# Patient Record
Sex: FEMALE | Race: WHITE | NOT HISPANIC OR LATINO | Employment: FULL TIME | URBAN - METROPOLITAN AREA
[De-identification: names, ages, dates, MRNs, and addresses within clinical notes are randomized per-mention and may not be internally consistent; named-entity substitution may affect disease eponyms.]

---

## 2018-04-23 ENCOUNTER — TELEPHONE (OUTPATIENT)
Dept: TRANSPLANT | Facility: CLINIC | Age: 33
End: 2018-04-23

## 2018-04-23 NOTE — TELEPHONE ENCOUNTER
----- Message from Roosevelt Henry sent at 4/23/2018 12:46 PM CDT -----  We have the pt recorders and they are now pending review by the referral nurse.  By:Roosevelt Henry

## 2018-04-24 ENCOUNTER — DOCUMENTATION ONLY (OUTPATIENT)
Dept: TRANSPLANT | Facility: CLINIC | Age: 33
End: 2018-04-24

## 2018-04-24 NOTE — LETTER
April 24, 2018    Jovanna GILLIS [address]  [facility] [address]      Dear Jovanna Love:    Your doctor has referred you to the [facility]. You will be contacted by our office and an initial appointment will then be scheduled for you.    We look forward to seeing you soon. If you have any further questions, please contact us at [phone].       Sincerely,        [facility]   [address] [place] [address]  [phone]

## 2018-04-24 NOTE — NURSING
Pt records reviewed.  Pt will be referred to Hepatitis C Clinic due to HEP C   Initial referral received from Ivis Peraza NP   Referral letter sent to provider and patient.

## 2018-04-24 NOTE — LETTER
April 24, 2018    Ivis Peraza, NP  [address] [address]      Dear Dr. Peraza    Patient: Jovanna Love   MR Number: [id number]   YOB: 1985     Thank you for the referral of Jovanna Love to the [facility]. An initial appointment will be scheduled for your patient with one of our Hepatologists.      Thank you again for your trust in our program.  If there is anything we can do for you or your staff, please feel free to contact us.        Sincerely,        [facility]  [address] [place] [address]  [phone]

## 2018-05-17 ENCOUNTER — INITIAL CONSULT (OUTPATIENT)
Dept: HEPATOLOGY | Facility: CLINIC | Age: 33
End: 2018-05-17
Payer: MEDICAID

## 2018-05-17 ENCOUNTER — LAB VISIT (OUTPATIENT)
Dept: LAB | Facility: HOSPITAL | Age: 33
End: 2018-05-17
Payer: MEDICAID

## 2018-05-17 VITALS
BODY MASS INDEX: 23.69 KG/M2 | TEMPERATURE: 98 F | DIASTOLIC BLOOD PRESSURE: 74 MMHG | HEIGHT: 65 IN | SYSTOLIC BLOOD PRESSURE: 119 MMHG | HEART RATE: 101 BPM | OXYGEN SATURATION: 98 % | RESPIRATION RATE: 18 BRPM | WEIGHT: 142.19 LBS

## 2018-05-17 DIAGNOSIS — B18.2 CHRONIC HEPATITIS C WITHOUT HEPATIC COMA: ICD-10-CM

## 2018-05-17 DIAGNOSIS — B18.2 CHRONIC HEPATITIS C WITHOUT HEPATIC COMA: Primary | ICD-10-CM

## 2018-05-17 PROCEDURE — 82247 BILIRUBIN TOTAL: CPT

## 2018-05-17 PROCEDURE — 99214 OFFICE O/P EST MOD 30 MIN: CPT | Mod: PBBFAC | Performed by: PHYSICIAN ASSISTANT

## 2018-05-17 PROCEDURE — 36415 COLL VENOUS BLD VENIPUNCTURE: CPT

## 2018-05-17 PROCEDURE — 99999 PR PBB SHADOW E&M-EST. PATIENT-LVL IV: CPT | Mod: PBBFAC,,, | Performed by: PHYSICIAN ASSISTANT

## 2018-05-17 PROCEDURE — 99203 OFFICE O/P NEW LOW 30 MIN: CPT | Mod: S$PBB,,, | Performed by: PHYSICIAN ASSISTANT

## 2018-05-17 RX ORDER — BUSPIRONE HYDROCHLORIDE 10 MG/1
10 TABLET ORAL
COMMUNITY

## 2018-05-17 RX ORDER — SERTRALINE HYDROCHLORIDE 50 MG/1
50 TABLET, FILM COATED ORAL DAILY
COMMUNITY

## 2018-05-17 RX ORDER — AMITRIPTYLINE HYDROCHLORIDE 50 MG/1
50 TABLET, FILM COATED ORAL NIGHTLY
COMMUNITY

## 2018-05-17 RX ORDER — MEDROXYPROGESTERONE ACETATE 150 MG/ML
1 INJECTION, SUSPENSION INTRAMUSCULAR
COMMUNITY
End: 2022-07-07

## 2018-05-17 NOTE — LETTER
May 17, 2018      Ivis Peraza, NP  [address] [facility] [address]           [facility] - Hepatitis C  [address]  [facility] [address]  Phone: [phone]  Fax: [phone]          Patient: Jovanna Love   MR Number: [id number]   YOB: 1985   Date of Visit: 5/17/2018       Dear Ivis Peraza:    Thank you for referring Jovanna Love to me for evaluation. Attached you will find relevant portions of my assessment and plan of care.    If you have questions, please do not hesitate to call me. I look forward to following Jovanna Love along with you.    Sincerely,    Jennifer B. Scheuermann, PA    Enclosure  CC:  No Recipients    If you would like to receive this communication electronically, please contact [email or web address] or [phone] to request more information on [employer] access.    For providers and/or their staff who would like to refer a patient to [facility], please contact us through our one-stop-shop provider referral line, [facility], at [phone].    If you feel you have received this communication in error or would no longer like to receive these types of communications, please e-mail [email or web address]

## 2018-05-17 NOTE — PROGRESS NOTES
"HEPATOLOGY CLINIC VISIT NOTE - HCV clinic    OUTSIDE REFERRING PROVIDER: Ivis Peraza NP    CHIEF COMPLAINT: Hepatitis C     HISTORY: This is a 32 y.o. White female with chronic hepatitis C, here for further eval / mngmt. Outside records have been received / reviewed.      HCV history:  Originally diagnosed 2-3 yrs ago.  Risks for HCV:  IVDA - first use 10 yrs ago, last use 2 yrs ago    Nasal drug use - first use 16 yrs old    Tattoos - first one placed age 16    No blood transfusions    - Treatment naive  - Genotype 3  - NS5A resistance not known  - HCV RNA 52,300 IU/mL - 2018    Liver staging:  No formal liver staging  No liver imaging  Labs reveal well preserved liver function; no obvious advanced fibrosis    Substance abuse:  - prior nasal and IV drug use, first use age 16.   - incarcerated x 2 yrs - drug related charges; recently got out  - last drug use prior to incarceration - 2 yrs ago   Very motivated to stay clean "I don't want to go to group home again"    Other:  Describes getting all 3 shots for hepatitis vaccine while in group home      Feels well  Interested in HCV treatment  Denies jaundice, dark urine, abdominal distention, hematemesis, melena, slowed mentation.   No abnormal skin rashes. No generalized joint pain.                    Past Medical History:   Diagnosis Date    Chronic hepatitis C        Past Surgical History:   Procedure Laterality Date     SECTION         FAMILY HISTORY: Negative for liver disease    SOCIAL HISTORY:   Not   13 year old daughter  Working at hair salon, answering phones and cleaning up  History   Smoking Status    Current Every Day Smoker    Packs/day: 1.00    Types: Cigarettes   Smokeless Tobacco    Not on file       Alcohol - very rare  Drugs - see above. None x 2 yrs. Prior IV heroin      ROS:   No fever, chills, weight loss, fatigue  No chest pain, palpitations, dyspnea, cough  No abdominal pain, change in bowel pattern, nausea, vomiting, " GERD  No dysuria, hematuria. Some urinary hesitancy.   No skin rashes   No headaches  No lower extremity edema      PHYSICAL EXAM:  Friendly White female, in no acute distress; alert and oriented to person, place and time  VITALS: reviewed  HEENT: Sclerae anicteric.   NECK: Supple  CVS: Regular rate and rhythm. No murmurs  LUNGS: Normal respiratory effort. Clear bilaterally  ABDOMEN: Flat, soft, nontender. No organomegaly or masses. No ascites or hernias. Good bowel sounds.    SKIN: Warm and dry. No jaundice, No obvious rashes.   EXTREMITIES: No lower extremity edema  NEURO/PSYCH: Normal gate. Memory intact. Thought and speech pattern appropriate. Behavior normal. No depression or anxiety noted.      RECENT LABS:  Outside labs:  4/18/18:  HIV neg  HBsAg neg  BUN 15, CR 0.68, , K 4.3, TBILI 0.3, ALKPHOS 86, AST 14, ALT 17  WBC 6.1, HGB 13.3,       RECENT IMAGING:  NONE TO REVIEW    ASSESSMENT  32 y.o. White female with:  1. CHRONIC HEPATITIS C, GENOTYPE 3 - treatment naive  -- no liver staging  -- prior vaccination for HAV & HBV while in penitentiary    2. HISTORY OF DRUG USE  -- clean since 2017        EDUCATION:  The natural history of Hepatitis C, including potential progression to cirrhosis was reviewed. We discussed the increased progression of liver disease secondary to alcohol use; patient was advised to avoid alcohol completely.     Transmission of Hepatitis C was reviewed, including possible sexual transmission. Sexual contacts should be screened. Risk of vertical transmission of Hepatitis C from mother to baby was reviewed. Children should be screened. Patient should avoid sharing personal products such as razors, toothbrushes, etc.     We reviewed that vaccination against Hepatitis A and Hepatitis B is recommended if patient does not already have immunity.    Recommend avoiding raw seafood.  Limit acetaminophen to 2000mg daily.          PLAN:  1. Labs & orders  Orders Placed This Encounter    Procedures    US Abdomen Complete    US Elastography Liver    HCV FibroSURE       2. F/U visit. Goal of antiviral therapy.

## 2018-05-18 LAB
A2 MACROGLOB SERPL-MCNC: 230 MG/DL
ALT SERPL W P-5'-P-CCNC: 24 U/L (ref 7–45)
APO A-I SERPL-MCNC: 130 MG/DL
BILIRUB SERPL-MCNC: 0.3 MG/DL
BIOPREDICTIVE SERIAL NUMBER: ABNORMAL
FIBROSIS STAGE SERPL QL: ABNORMAL
FIBROTEST INTERPRETATION: ABNORMAL
FIBROTEST-ACTITEST COMMENT: ABNORMAL
GGT SERPL-CCNC: 17 U/L
HAPTOGLOB SERPL-MCNC: 227 MG/DL
LIVER FIBR SCORE SERPL CALC.FIBROSURE: 0.08
NECROINFLAMMAT INTERP: ABNORMAL
NECROINFLAMMATORY ACT GRADE SERPL QL: ABNORMAL
NECROINFLAMMATORY ACT SCORE SERPL: 0.08

## 2018-06-21 ENCOUNTER — OFFICE VISIT (OUTPATIENT)
Dept: HEPATOLOGY | Facility: CLINIC | Age: 33
End: 2018-06-21
Payer: MEDICAID

## 2018-06-21 ENCOUNTER — HOSPITAL ENCOUNTER (OUTPATIENT)
Dept: RADIOLOGY | Facility: HOSPITAL | Age: 33
Discharge: HOME | End: 2018-06-21
Attending: PHYSICIAN ASSISTANT
Payer: MEDICAID

## 2018-06-21 ENCOUNTER — TELEPHONE (OUTPATIENT)
Dept: PHARMACY | Facility: CLINIC | Age: 33
End: 2018-06-21

## 2018-06-21 ENCOUNTER — PROCEDURE VISIT (OUTPATIENT)
Dept: HEPATOLOGY | Facility: CLINIC | Age: 33
End: 2018-06-21
Attending: PHYSICIAN ASSISTANT
Payer: MEDICAID

## 2018-06-21 VITALS
HEART RATE: 110 BPM | DIASTOLIC BLOOD PRESSURE: 79 MMHG | BODY MASS INDEX: 23.03 KG/M2 | WEIGHT: 138.25 LBS | OXYGEN SATURATION: 98 % | RESPIRATION RATE: 18 BRPM | TEMPERATURE: 97 F | SYSTOLIC BLOOD PRESSURE: 111 MMHG | HEIGHT: 65 IN

## 2018-06-21 DIAGNOSIS — B18.2 CHRONIC HEPATITIS C WITHOUT HEPATIC COMA: ICD-10-CM

## 2018-06-21 DIAGNOSIS — B18.2 CHRONIC HEPATITIS C WITHOUT HEPATIC COMA: Primary | ICD-10-CM

## 2018-06-21 PROCEDURE — 76700 US EXAM ABDOM COMPLETE: CPT | Mod: 26,,, | Performed by: RADIOLOGY

## 2018-06-21 PROCEDURE — 76700 US EXAM ABDOM COMPLETE: CPT | Mod: 59,TC

## 2018-06-21 PROCEDURE — 99213 OFFICE O/P EST LOW 20 MIN: CPT | Mod: S$PBB,,, | Performed by: PHYSICIAN ASSISTANT

## 2018-06-21 PROCEDURE — 99214 OFFICE O/P EST MOD 30 MIN: CPT | Mod: PBBFAC,25 | Performed by: PHYSICIAN ASSISTANT

## 2018-06-21 PROCEDURE — 91200 LIVER ELASTOGRAPHY: CPT | Mod: PBBFAC | Performed by: PHYSICIAN ASSISTANT

## 2018-06-21 PROCEDURE — 99999 PR PBB SHADOW E&M-EST. PATIENT-LVL IV: CPT | Mod: PBBFAC,,, | Performed by: PHYSICIAN ASSISTANT

## 2018-06-21 PROCEDURE — 91200 LIVER ELASTOGRAPHY: CPT | Mod: 26,S$PBB,, | Performed by: PHYSICIAN ASSISTANT

## 2018-06-21 RX ORDER — GABAPENTIN 100 MG/1
100 CAPSULE ORAL
Refills: 1 | COMMUNITY
Start: 2018-05-17

## 2018-06-21 NOTE — PROGRESS NOTES
"HEPATOLOGY CLINIC VISIT NOTE - HCV clinic    CHIEF COMPLAINT: Hepatitis C     HISTORY: This is a 33 y.o. White female with chronic hepatitis C, here for f/u w/ labs, imaging, liver staging    Feels well  Interested in HCV treatment  Denies jaundice, dark urine, abdominal distention, hematemesis, melena, slowed mentation.   No abnormal skin rashes. No generalized joint pain.     HCV history:  Originally diagnosed 2-3 yrs ago.  Risks for HCV:  IVDA - first use 10 yrs ago, last use 2 yrs ago    Nasal drug use - first use 16 yrs old    Tattoos - first one placed age 16    No blood transfusions    - Treatment naive  - Genotype 3  - NS5A resistance not known  - HCV RNA 52,300 IU/mL - 2018    Liver staging:  FibroScan today 18 - kPa 6.0, F0-1  HCV FibroSURE 18 - A0, F0    Labs reveal well preserved liver function; no obvious advanced fibrosis    Substance abuse:  - prior nasal and IV drug use, first use age 16.   - incarcerated x 2 yrs - drug related charges; recently got out  - last drug use prior to incarceration - 2 yrs ago   Very motivated to stay clean "I don't want to go to detention again"    Other:  Describes getting all 3 shots for hepatitis vaccine while in detention                   Past Medical History:   Diagnosis Date    Anxiety     Chronic hepatitis C     History of drug use     heroin. 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 None x 2 yrs. Prior IV heroin      ROS:   No fever, chills, weight loss, fatigue  No chest pain, palpitations, dyspnea, cough  No abdominal pain, nausea, vomiting, GERD  No skin rashes   No " headaches  No lower extremity edema      PHYSICAL EXAM:  Friendly White female, in no acute distress; alert and oriented to person, place and time  VITALS: reviewed  HEENT: Sclerae anicteric.   NECK: Supple  LUNGS: Normal respiratory effort.  ABDOMEN: Flat, soft, nontender.   SKIN: Warm and dry. No jaundice, No obvious rashes.   EXTREMITIES: No lower extremity edema  NEURO/PSYCH: Normal gate. Memory intact. Thought and speech pattern appropriate. Behavior normal. No depression or anxiety noted.      RECENT LABS:  Outside labs:  4/18/18:  HIV neg  HBsAg neg  BUN 15, CR 0.68, , K 4.3, TBILI 0.3, ALKPHOS 86, AST 14, ALT 17  WBC 6.1, HGB 13.3,       RECENT IMAGING:  Results for orders placed during the hospital encounter of 06/21/18   US Abdomen Complete    Narrative EXAMINATION:  US ABDOMEN COMPLETE    CLINICAL HISTORY:  Chronic viral hepatitis C    TECHNIQUE:  Complete abdominal ultrasound (including pancreas, liver, gallbladder, common bile duct, spleen, aorta, IVC, and kidneys) was performed.    COMPARISON:  None    FINDINGS:  Liver: Enlarged in size, measuring 19.2 cm. Diffusely increased parenchymal echogenicity suggestive for steatosis.  No focal hepatic lesions.    Gallbladder: No calculi, wall thickening, or pericholecystic fluid.  No sonographic De's sign.    Biliary system: The common duct is not dilated, measuring 3 mm.  No intrahepatic ductal dilatation.    Spleen: Normal in size with a homogeneous echotexture, measuring 11.0 x 3.9 cm.    Pancreas: The visualized portions of pancreas appear normal.  Peripancreatic lymph node measuring 1.3 x 0.8 x 2.3 cm.    Right kidney: Normal in size with no hydronephrosis, measuring 12.0 cm.    Left kidney:  Normal in size with no hydronephrosis, measuring 11.2 cm.    Aorta: No aneurysm.    Inferior vena cava: Normal in appearance.    Miscellaneous: No ascites.      Impression Hepatomegaly with hepatic steatosis.    Electronically signed by resident:  Angel Daisy  Date:    06/21/2018  Time:    09:14    Electronically signed by: Caden Ulloa MD  Date:    06/21/2018  Time:    09:18           ASSESSMENT  33 y.o. White female with:  1. CHRONIC HEPATITIS C, GENOTYPE 3 - treatment naive  -- F0-1 on both FibroScan and FibroSURE  -- prior vaccination for HAV & HBV while in intermediate; need to document immune status    2. HISTORY OF DRUG USE  -- clean since 2017        EDUCATION:  Discussed goal of HCV eradication to prevent progression of liver disease.  Discussed use of Mavyret (3 pills daily with food) x 8 weeks with potential side effects of fatigue, headache, nausea, diarrhea.     Discussed potential for drug interactions with Mavyret.  Current med list reviewed - no interactions noted. Pt notified that current birth control is okay but must notify me if changes will be made due to interaction b/w ethinyl estradiol and mavyret  Pt to contact me if new medicines are prescribed.  Herbal / alternative therapies must be avoided.    Discussed importance of medication adherence and risk of treatment failure / viral resistance if not adherent. Pt has verbalized understanding.        *Pt is of child bearing potential; high priority for HCV treatment    PLAN:  1. Obtain authorization to treat HCV with mavyret daily x 8  weeks  -- Rx will be routed to [facility]  -- Patient will notify me of exact treatment start date so appropriate lab f/u can be scheduled.    2. Labs today  Orders Placed This Encounter   Procedures    Protime-INR    Hepatitis C genotype    CBC auto differential    Comprehensive metabolic panel    HIV-1 and HIV-2 antibodies    Hepatitis B surface antigen    Hepatitis B surface antibody    Hepatitis B core antibody, total    Hepatitis A antibody, IgG           Cc: Ivis Peraza NP

## 2018-06-21 NOTE — PROCEDURES
Procedures     Fibroscan Procedure     Name: Jovanna Love  Date of Procedure : 2018   :: Jennifer B Scheuermann, PA  Diagnosis: HCV    Probe: M    Fibroscan readin.0 KPa    Fibrosis:F 0-1     CAP readin dB/m    Steatosis: :S1

## 2018-06-28 ENCOUNTER — TELEPHONE (OUTPATIENT)
Dept: HEPATOLOGY | Facility: CLINIC | Age: 33
End: 2018-06-28

## 2018-06-28 DIAGNOSIS — R74.8 ABNORMAL TRANSAMINASES: ICD-10-CM

## 2018-06-28 DIAGNOSIS — Z23 VACCINE FOR VIRAL HEPATITIS: ICD-10-CM

## 2018-06-28 DIAGNOSIS — B18.2 CHRONIC HEPATITIS C WITHOUT HEPATIC COMA: Primary | ICD-10-CM

## 2018-06-28 NOTE — TELEPHONE ENCOUNTER
6/21 labs reviewed  pls call pt  1. HIV screen neg  2. Liver labs all stable. Liver enzymes are elevated from HCV. We will proceed w/ treatment as we discussed.  3. She has protection against Hep A; this vaccine not needed  4. Labs show no protection against Hep B even though she recalls getting vaccine in past. I am sending Rx for this to [facility] to see if it is covered there. They will be in touch.  5. Call me when you have your HCV meds    thanks

## 2018-06-29 ENCOUNTER — TELEPHONE (OUTPATIENT)
Dept: HEPATOLOGY | Facility: CLINIC | Age: 33
End: 2018-06-29

## 2018-06-29 NOTE — TELEPHONE ENCOUNTER
----- Message from Jennifer B. Scheuermann, PA sent at 6/28/2018  6:27 PM CDT -----  Regarding: FW: hep b  pls contact pt.  HBV vaccine is not covered by her insurance in the pharmacy.  She will need to either ask her PCP to give it to her.  OR I can set it up at main campus.  Thanks    ----- Message -----  From: Lea Saunders, PharmD  Sent: 6/28/2018   8:06 AM  To: Jennifer B. Scheuermann, PA  Subject: hep b                                            Good morning! Mrs. Love  Immunization is not covered by her pharmacy benefits if you would like to contact the patient. Thank you!

## 2018-06-29 NOTE — TELEPHONE ENCOUNTER
Attempt made to reach patient.  Unable to LVM.  Message from provider mailed to her.  I included an order for her to bring to PCP to have Hep B vaccines series done.  I asked asked that the contact us here if PCP not able to provide and she wanted series scheduled here in [place].

## 2018-08-20 NOTE — TELEPHONE ENCOUNTER
Mavalexeyet denial upheld by patients insurance. We will be assisting the patient in applying to the . Sending application prescription to Jennifer Scheuermann PA for her review and signature.

## 2018-08-29 ENCOUNTER — EPISODE CHANGES (OUTPATIENT)
Dept: HEPATOLOGY | Facility: CLINIC | Age: 33
End: 2018-08-29

## 2018-09-11 NOTE — TELEPHONE ENCOUNTER
FOR DOCUMENTATION ONLY:  Financial Assistance for Mavyret is approved from 9-10-18 to 3-9-19  Source [employer] Patient Assistance  Sending a staff message to Jennifer Scheuermann PA regarding approval.

## 2018-09-12 ENCOUNTER — TELEPHONE (OUTPATIENT)
Dept: HEPATOLOGY | Facility: CLINIC | Age: 33
End: 2018-09-12

## 2018-09-12 NOTE — TELEPHONE ENCOUNTER
----- Message from Toshia Hernandez sent at 9/11/2018  9:24 AM CDT -----  Regarding: Mavyret Assistance Approval  Patient was approved to receive her Mavyret through the [employer] Patient Assistance Program X 8 weeks. Patient has been informed.  Thank you  Nickie  [id number]

## 2018-09-26 ENCOUNTER — TELEPHONE (OUTPATIENT)
Dept: PHARMACY | Facility: CLINIC | Age: 33
End: 2018-09-26

## 2018-09-26 NOTE — TELEPHONE ENCOUNTER
Patient called to f/u on receipt of Mavyret from [facility] pharmacy.  Patient was not available - family member to ask patient to call us back.     JACK Nick.Ph.  Clinical Pharmacist  [facility]  Phone: [phone]

## 2018-09-28 NOTE — TELEPHONE ENCOUNTER
Patient should be receiving Mavyret from [facility] on Thursday 10/4/18.  Advised Nickie to have patient call back early next week to conduct initial consult.  Nickie verbalized understanding.

## 2018-10-04 NOTE — TELEPHONE ENCOUNTER
Attempted to contact patient to check on status of Mavyret from [employer] P[facility].  Unable to contact patient - M.

## 2018-10-09 NOTE — TELEPHONE ENCOUNTER
Patient called to follow up - [facility].     Serge Salazar, R.Ph.  Clinical Pharmacist  [facility]  Phone: [phone]

## 2018-10-12 NOTE — TELEPHONE ENCOUNTER
Attempted to contact patient regarding Mavyret from [employer] [facility].  Unable to contact patient or patient's mother.

## 2018-10-16 NOTE — TELEPHONE ENCOUNTER
Spoke with Nickie, patient's mother.  Nickie states patient has received Mavyret but has not began treatment yet.  Asked Nickie to have patient give [facility] a call to go over Mavyret.  Nickie verbalized understanding and will forward patient to [facility].

## 2018-10-22 ENCOUNTER — EPISODE CHANGES (OUTPATIENT)
Dept: HEPATOLOGY | Facility: CLINIC | Age: 33
End: 2018-10-22

## 2018-10-30 NOTE — TELEPHONE ENCOUNTER
Attempted to contact patient and patient's mother to determine Mavyret status.  Unable to contact - LVM.

## 2018-11-07 NOTE — TELEPHONE ENCOUNTER
Attempted to contact patient and patient's mother to check status of Mavyret and if patient has started taking medication.  Unable to contact patient/patient's mother - M.

## 2018-11-08 PROBLEM — K52.9 COLITIS: Status: ACTIVE | Noted: 2018-11-08

## 2018-11-08 PROBLEM — B19.20 HEPATITIS C VIRUS INFECTION: Status: ACTIVE | Noted: 2018-11-08

## 2018-11-08 PROBLEM — K59.03 DRUG-INDUCED CONSTIPATION: Status: ACTIVE | Noted: 2018-11-08

## 2018-11-08 PROBLEM — F11.20 HEROIN DEPENDENCE: Status: ACTIVE | Noted: 2018-11-08

## 2018-11-08 PROBLEM — Z72.0 TOBACCO ABUSE: Status: ACTIVE | Noted: 2018-11-08

## 2018-11-08 PROBLEM — F14.10 COCAINE ABUSE: Status: ACTIVE | Noted: 2018-11-08

## 2018-11-08 PROBLEM — J18.9 PNEUMONIA DUE TO INFECTIOUS ORGANISM: Status: ACTIVE | Noted: 2018-11-08

## 2018-11-08 PROBLEM — A41.9 SEPSIS: Status: ACTIVE | Noted: 2018-11-08

## 2018-11-08 PROBLEM — F41.9 ANXIETY: Status: ACTIVE | Noted: 2018-11-08

## 2018-11-08 PROBLEM — E87.6 HYPOKALEMIA: Status: ACTIVE | Noted: 2018-11-08

## 2018-11-08 PROBLEM — R10.9 ABDOMINAL PAIN: Status: ACTIVE | Noted: 2018-11-08

## 2018-11-09 PROBLEM — D72.829 LEUKOCYTOSIS: Status: ACTIVE | Noted: 2018-11-09

## 2018-11-11 PROBLEM — R09.89 ENDOCARDITIS, SUSPECTED: Status: ACTIVE | Noted: 2018-11-11

## 2018-11-11 PROBLEM — B95.62 BACTEREMIA DUE TO METHICILLIN RESISTANT STAPHYLOCOCCUS AUREUS: Status: ACTIVE | Noted: 2018-11-11

## 2018-11-11 PROBLEM — R78.81 BACTEREMIA DUE TO METHICILLIN RESISTANT STAPHYLOCOCCUS AUREUS: Status: ACTIVE | Noted: 2018-11-11

## 2018-11-12 PROBLEM — F11.93 OPIOID WITHDRAWAL: Status: ACTIVE | Noted: 2018-11-12

## 2018-11-12 PROBLEM — R22.9 MASS OF SUBCUTANEOUS TISSUE: Status: ACTIVE | Noted: 2018-11-12

## 2018-11-13 PROBLEM — R10.9 ABDOMINAL PAIN: Status: RESOLVED | Noted: 2018-11-08 | Resolved: 2018-11-13

## 2018-11-13 PROBLEM — A41.9 SEPSIS: Status: RESOLVED | Noted: 2018-11-08 | Resolved: 2018-11-13

## 2018-11-13 PROBLEM — R09.89 ENDOCARDITIS, SUSPECTED: Status: RESOLVED | Noted: 2018-11-11 | Resolved: 2018-11-13

## 2018-11-16 PROBLEM — R22.9 MASS OF SUBCUTANEOUS TISSUE: Status: RESOLVED | Noted: 2018-11-12 | Resolved: 2018-11-16

## 2018-11-16 PROBLEM — E87.6 HYPOKALEMIA: Status: RESOLVED | Noted: 2018-11-08 | Resolved: 2018-11-16

## 2018-11-19 ENCOUNTER — TELEPHONE (OUTPATIENT)
Dept: HEPATOLOGY | Facility: CLINIC | Age: 33
End: 2018-11-19

## 2018-11-19 NOTE — TELEPHONE ENCOUNTER
----- Message from Amandeep Mishra PharmD sent at 11/19/2018  9:48 AM CST -----  Regarding: RE: [employer] PAP - Mavyret  Contact: [phone]  [facility] sent out shipments on 10/4 and 11/6.  Not sure if she's taking though....    ----- Message -----  From: Jennifer B. Scheuermann, PA  Sent: 11/18/2018   2:12 PM  To: Amandeep Mishra PharmD  Subject: RE: [employer] PAP - Mavyret                         So did we find out if [facility] sent her the meds??  If not, let's tell them Rx is cancelled for now and close her account.    Thanks    ----- Message -----  From: Amandeep Mishra PharmD  Sent: 10/12/2018   4:58 PM  To: Jennifer B. Scheuermann, PA  Subject: RE: [employer] PAP - Mavyret                         I'm checking with [employer] that way I can at least tell you if she received it.  ----- Message -----  From: Jennifer B. Scheuermann, PA  Sent: 10/12/2018   4:39 PM  To: Amandeep Mishra PharmD  Subject: RE: [employer] PAP - Mavyret                         No other way to reach her ... Do we know if [facility] shipped out the meds on that date? Can we track that?    ----- Message -----  From: Amandeep Mishra PharmD  Sent: 10/12/2018   4:01 PM  To: Jennifer B. Scheuermann, PA  Subject: [employer] PAP - Mavyret                             We have been trying to contact this patient to complete her consult, but have only been able to contact the mother one time.  Patient was suppossed to received her Mavyret on 10/4, but we have not been able to confirm.  Do you know of any other way to contact the patient?  Thanks!    Amandeep Mishra PharmD  Clinical Pharmacist  [facility]  [phone]

## 2018-11-19 NOTE — TELEPHONE ENCOUNTER
PC to pt - [phone] - number disconnected  PC to pt's mother (emergency contact) - [phone] - left  for pt to call our office back      Per [place], pt was shipped two monthly shipments of Mavyret on 10/4 & 11/6    Unclear if pt received meds or if she started treatment.  Recent ER visits reviewed, appears pt using drugs again    Letter will be sent to pt asking her to call office.

## 2020-03-12 ENCOUNTER — NURSE TRIAGE (OUTPATIENT)
Dept: ADMINISTRATIVE | Facility: CLINIC | Age: 35
End: 2020-03-12

## 2020-03-13 NOTE — TELEPHONE ENCOUNTER
Reason for Disposition   Message left with person in household.    Protocols used: NO CONTACT OR DUPLICATE CONTACT CALL-A-AH      Friend by the name of Adalberto states her mother brought her home.

## 2021-05-06 ENCOUNTER — PATIENT MESSAGE (OUTPATIENT)
Dept: RESEARCH | Facility: HOSPITAL | Age: 36
End: 2021-05-06

## 2021-06-11 DIAGNOSIS — N63.10 MASS OF RIGHT BREAST: Primary | ICD-10-CM

## 2022-05-25 NOTE — TELEPHONE ENCOUNTER
Called again to f/u on mavyret start date and conduct patient education as appropriate.  Mavyret supplied by [employer] P[facility].  NA - lvm.     JACK Nick.Ph.  Clinical Pharmacist  [facility]  Phone: [phone]       preop

## 2022-07-07 ENCOUNTER — OFFICE VISIT (OUTPATIENT)
Dept: OBSTETRICS AND GYNECOLOGY | Facility: CLINIC | Age: 37
End: 2022-07-07
Payer: MEDICAID

## 2022-07-07 VITALS
BODY MASS INDEX: 26.74 KG/M2 | HEART RATE: 84 BPM | SYSTOLIC BLOOD PRESSURE: 114 MMHG | HEIGHT: 64 IN | RESPIRATION RATE: 12 BRPM | WEIGHT: 156.63 LBS | DIASTOLIC BLOOD PRESSURE: 76 MMHG | OXYGEN SATURATION: 100 %

## 2022-07-07 DIAGNOSIS — Z30.46 ENCOUNTER FOR REMOVAL AND REINSERTION OF NEXPLANON: ICD-10-CM

## 2022-07-07 DIAGNOSIS — Z12.4 CERVICAL CANCER SCREENING: Primary | ICD-10-CM

## 2022-07-07 DIAGNOSIS — Z01.419 ENCNTR FOR GYN EXAM (GENERAL) (ROUTINE) W/O ABN FINDINGS: ICD-10-CM

## 2022-07-07 PROCEDURE — 3078F PR MOST RECENT DIASTOLIC BLOOD PRESSURE < 80 MM HG: ICD-10-PCS | Mod: CPTII,,, | Performed by: STUDENT IN AN ORGANIZED HEALTH CARE EDUCATION/TRAINING PROGRAM

## 2022-07-07 PROCEDURE — 3008F BODY MASS INDEX DOCD: CPT | Mod: CPTII,,, | Performed by: STUDENT IN AN ORGANIZED HEALTH CARE EDUCATION/TRAINING PROGRAM

## 2022-07-07 PROCEDURE — 99999 PR PBB SHADOW E&M-EST. PATIENT-LVL III: CPT | Mod: PBBFAC,,, | Performed by: STUDENT IN AN ORGANIZED HEALTH CARE EDUCATION/TRAINING PROGRAM

## 2022-07-07 PROCEDURE — 99213 OFFICE O/P EST LOW 20 MIN: CPT | Mod: PBBFAC,PO | Performed by: STUDENT IN AN ORGANIZED HEALTH CARE EDUCATION/TRAINING PROGRAM

## 2022-07-07 PROCEDURE — 87624 HPV HI-RISK TYP POOLED RSLT: CPT | Performed by: STUDENT IN AN ORGANIZED HEALTH CARE EDUCATION/TRAINING PROGRAM

## 2022-07-07 PROCEDURE — 3074F SYST BP LT 130 MM HG: CPT | Mod: CPTII,,, | Performed by: STUDENT IN AN ORGANIZED HEALTH CARE EDUCATION/TRAINING PROGRAM

## 2022-07-07 PROCEDURE — 1160F RVW MEDS BY RX/DR IN RCRD: CPT | Mod: CPTII,,, | Performed by: STUDENT IN AN ORGANIZED HEALTH CARE EDUCATION/TRAINING PROGRAM

## 2022-07-07 PROCEDURE — 1160F PR REVIEW ALL MEDS BY PRESCRIBER/CLIN PHARMACIST DOCUMENTED: ICD-10-PCS | Mod: CPTII,,, | Performed by: STUDENT IN AN ORGANIZED HEALTH CARE EDUCATION/TRAINING PROGRAM

## 2022-07-07 PROCEDURE — 99999 PR PBB SHADOW E&M-EST. PATIENT-LVL III: ICD-10-PCS | Mod: PBBFAC,,, | Performed by: STUDENT IN AN ORGANIZED HEALTH CARE EDUCATION/TRAINING PROGRAM

## 2022-07-07 PROCEDURE — 88175 CYTOPATH C/V AUTO FLUID REDO: CPT | Performed by: STUDENT IN AN ORGANIZED HEALTH CARE EDUCATION/TRAINING PROGRAM

## 2022-07-07 PROCEDURE — 3008F PR BODY MASS INDEX (BMI) DOCUMENTED: ICD-10-PCS | Mod: CPTII,,, | Performed by: STUDENT IN AN ORGANIZED HEALTH CARE EDUCATION/TRAINING PROGRAM

## 2022-07-07 PROCEDURE — 3074F PR MOST RECENT SYSTOLIC BLOOD PRESSURE < 130 MM HG: ICD-10-PCS | Mod: CPTII,,, | Performed by: STUDENT IN AN ORGANIZED HEALTH CARE EDUCATION/TRAINING PROGRAM

## 2022-07-07 PROCEDURE — 99385 PR PREVENTIVE VISIT,NEW,18-39: ICD-10-PCS | Mod: S$PBB,,, | Performed by: STUDENT IN AN ORGANIZED HEALTH CARE EDUCATION/TRAINING PROGRAM

## 2022-07-07 PROCEDURE — 99385 PREV VISIT NEW AGE 18-39: CPT | Mod: S$PBB,,, | Performed by: STUDENT IN AN ORGANIZED HEALTH CARE EDUCATION/TRAINING PROGRAM

## 2022-07-07 PROCEDURE — 1159F PR MEDICATION LIST DOCUMENTED IN MEDICAL RECORD: ICD-10-PCS | Mod: CPTII,,, | Performed by: STUDENT IN AN ORGANIZED HEALTH CARE EDUCATION/TRAINING PROGRAM

## 2022-07-07 PROCEDURE — 3078F DIAST BP <80 MM HG: CPT | Mod: CPTII,,, | Performed by: STUDENT IN AN ORGANIZED HEALTH CARE EDUCATION/TRAINING PROGRAM

## 2022-07-07 PROCEDURE — 1159F MED LIST DOCD IN RCRD: CPT | Mod: CPTII,,, | Performed by: STUDENT IN AN ORGANIZED HEALTH CARE EDUCATION/TRAINING PROGRAM

## 2022-07-07 NOTE — PROGRESS NOTES
Subjective:    Patient ID: Jovanna Love is a 37 y.o. y.o. female.     Chief Complaint: Annual Well Woman Exam     History of Present Illness:  Jovanna presents today for Annual Well Woman exam. She describes her menses as absent with Nexplanon in place.She denies pelvic pain.  She admits to feeling breast lumps but no pain. She denies difficulty with urination or bowel movements. She denies menopausal symptoms such as hotflashes, vaginal dryness, and night sweats. She denies bloating, early satiety, or weight changes. She is sexually active. Contraception is by Nexplanon.      Menstrual History:   No LMP recorded. (Menstrual status: Birth Control)..     OB History        1    Para   1    Term   1            AB        Living           SAB        IAB        Ectopic        Multiple        Live Births                     The following portions of the patient's history were reviewed and updated as appropriate: allergies, current medications, past family history, past medical history, past social history, past surgical history and problem list.    ROS:   CONSTITUTIONAL: Negative for fever, chills, diaphoresis, weakness, fatigue, weight loss, weight gain  ENT: dental problem  EYES: negative for blurry vision, decreased vision, loss of vision, eye pain, diplopia, photophobia, discharge  SKIN: Negative for rash, itching, hives  RESPIRATORY: shortness of breath, wheezing  CARDIOVASCULAR: palpitations  BREAST: negative for breast  tenderness, breast mass, nipple discharge, or skin changes  GASTROINTESTINAL: constipation  GENITOURINARY: negative for abnormal vaginal bleeding, amenorrhea, decreased libido, dysuria, genital sores, hematuria, incontinence, menorrhagia, pelvic pain, urinary frequency, vaginal discharge  HEMATOLOGIC/LYMPHATIC: negative for swollen lymph nodes, bleeding, bruising  MUSCULOSKELETAL: negative for back pain, joint pain, joint stiffness, joint swelling, muscle pain, muscle  weakness  NEUROLOGICAL: headache  BEHAVORIAL/PSYCH: negative for anxiety, depression, psychosis  ENDOCRINE: negative for polydipsia/polyuria, palpitations, skin changes, temperature intolerance, unexpected weight changes  ALLERGIC/IMMUNOLOGIC: negative for urticaria, hay fever, angioedema      Objective:    Vital Signs:  Vitals:    07/07/22 0931   BP: 114/76   Pulse: 84   Resp: 12       Physical Exam:  General:  alert, cooperative, no distress   Skin:  Skin color, texture, turgor normal. No rashes or lesions   HEENT:  conjunctivae/corneas clear. PERRL.   Neck: supple, trachea midline, no adenopathy or thyromegally   Respiratory:  Normal effort   Breasts:  no discharge, erythema, tenderness, or palpable masses; no axillary lymphadenopathy   Abdomen:  soft, nontender, no palpable masses   Pelvis: External genitalia: normal general appearance  Urinary system: urethral meatus normal, bladder nontender  Vaginal: normal mucosa without prolapse or lesions  Cervix: normal appearance  Uterus: normal size, shape, position  Adnexa: normal size, nontender bilaterally   Extremities: Normal ROM; no edema, no cyanosis   Neurologial: Normal strength and tone. No focal numbness or weakness.   Psychiatric: normal mood, speech, dress, and thought processes         Assessment:       Healthy female exam.     1. Cervical cancer screening    2. Encntr for gyn exam (general) (routine) w/o abn findings    3. Encounter for removal and reinsertion of Nexplanon          Plan:      Problem List Items Addressed This Visit    None     Visit Diagnoses     Cervical cancer screening    -  Primary    Relevant Orders    Liquid-Based Pap Smear, Screening    HPV High Risk Genotypes, PCR    Encntr for gyn exam (general) (routine) w/o abn findings        Encounter for removal and reinsertion of Nexplanon        Relevant Orders    Device Authorization Order          COUNSELING:  Jovanna was counseled on A.C.O.G. Pap guidelines and recommendations for yearly  pelvic exams in addition to recommendations for monthly self breast exams; to see her PCP for other health maintenance.

## 2022-07-13 LAB
FINAL PATHOLOGIC DIAGNOSIS: NORMAL
Lab: NORMAL

## 2022-07-14 LAB
HPV HR 12 DNA SPEC QL NAA+PROBE: NEGATIVE
HPV16 AG SPEC QL: NEGATIVE
HPV18 DNA SPEC QL NAA+PROBE: NEGATIVE

## 2022-07-14 NOTE — PROGRESS NOTES
Patient portal message sent.     Please inform Ms. Nugent her pap smear is normal and to RTC in 1 year for annual exam.

## 2022-07-19 ENCOUNTER — PROCEDURE VISIT (OUTPATIENT)
Dept: OBSTETRICS AND GYNECOLOGY | Facility: CLINIC | Age: 37
End: 2022-07-19
Payer: MEDICAID

## 2022-07-19 VITALS
WEIGHT: 157.63 LBS | DIASTOLIC BLOOD PRESSURE: 76 MMHG | BODY MASS INDEX: 26.91 KG/M2 | RESPIRATION RATE: 16 BRPM | SYSTOLIC BLOOD PRESSURE: 104 MMHG | HEIGHT: 64 IN

## 2022-07-19 DIAGNOSIS — Z30.46 ENCOUNTER FOR REMOVAL AND REINSERTION OF NEXPLANON: Primary | ICD-10-CM

## 2022-07-19 PROCEDURE — 11983 REMOVE/INSERT DRUG IMPLANT: CPT | Mod: PBBFAC | Performed by: STUDENT IN AN ORGANIZED HEALTH CARE EDUCATION/TRAINING PROGRAM

## 2022-07-19 PROCEDURE — 11982 REMOVE DRUG IMPLANT DEVICE: CPT | Mod: PBBFAC,PO | Performed by: STUDENT IN AN ORGANIZED HEALTH CARE EDUCATION/TRAINING PROGRAM

## 2022-07-19 PROCEDURE — 11983 REMOVE/INSERT DRUG IMPLANT: CPT | Mod: S$PBB,,, | Performed by: STUDENT IN AN ORGANIZED HEALTH CARE EDUCATION/TRAINING PROGRAM

## 2022-07-19 PROCEDURE — 99499 NO LOS: ICD-10-PCS | Mod: S$PBB,,, | Performed by: STUDENT IN AN ORGANIZED HEALTH CARE EDUCATION/TRAINING PROGRAM

## 2022-07-19 PROCEDURE — 99499 UNLISTED E&M SERVICE: CPT | Mod: S$PBB,,, | Performed by: STUDENT IN AN ORGANIZED HEALTH CARE EDUCATION/TRAINING PROGRAM

## 2022-07-19 PROCEDURE — 11983 INSERTION OF NEXPLANON: ICD-10-PCS | Mod: S$PBB,,, | Performed by: STUDENT IN AN ORGANIZED HEALTH CARE EDUCATION/TRAINING PROGRAM

## 2022-07-19 PROCEDURE — 11981 INSERTION DRUG DLVR IMPLANT: CPT | Mod: PBBFAC,PO | Performed by: STUDENT IN AN ORGANIZED HEALTH CARE EDUCATION/TRAINING PROGRAM

## 2022-07-19 RX ORDER — ETONOGESTREL 68 MG/1
68 IMPLANT SUBCUTANEOUS ONCE
COMMUNITY

## 2022-07-19 RX ADMIN — ETONOGESTREL 68 MG: 68 IMPLANT SUBCUTANEOUS at 09:07

## 2022-07-19 NOTE — PROCEDURES
Insertion of Nexplanon    Date/Time: 7/19/2022 9:15 AM  Performed by: Brenda Sosa MD  Authorized by: Brenda Sosa MD     Consent obtained:  Written  Consent given by:  Patient  Patient questions answered: yes    Patient agrees, verbalizes understanding, and wants to proceed: yes    Educational handouts given: yes    Instructions and paperwork completed: yes    Pre-procedure timeout performed: yes    Prepped with: alcohol 70%    Local anesthetic:  Lidocaine 1%  The site was cleaned and prepped in a sterile fashion: yes    Small stab incision was made in arm: yes    Left/right:  Left   68 mg etonogestrel 68 mg  Nexplanon was inserted and trocar removed: yes    Visualization of notch in stilette and palpitation of device: yes    Palpitation confirms placement by provider and patient: yes    Site was closed with steri-strips and pressure bandage applied: yes

## 2022-07-19 NOTE — PROCEDURES
Procedures     NEXPLANON REMOVAL:    Patient's last menstrual period was unknown. She is here for Nexplanon removal.       PRE-NEXPLANON REMOVALCOUNSELING:  The patient was advised of minimal risks of bleeding and pain and she agrees to proceed.    PROCEDURE:  TIME OUT PERFORMED.  The patient was placed in position.  The arm was marked for insertion.   The area was prepped with antiseptic.  2 cc of 1% lidocaine was injected.   A small incision was made at the distal end of the palpable Nexplanon.   The nexplanon was grasped with a hemostat and removed without difficulty.     The incision site was closed with a small adhesive bandage and then pressure bandage was placed over insertion site.  The procedure was well tolerated     ASSESSMENT:  Contraceptive Management / NEXPLANON REMOVAL. V25.0.    POST NEXPLANON REMOVAL COUNSELING:  Manage arm pain with Tylenol or Rx per [facility].  Keep arm elevated and apply intermittent ice packs to decrease pain and bruising for 24 hours.  May remove bandage in 24 hours.  Danger signs (worsening pain at incision site, bleeding through  bandage, redness and/or pus drainage at incision site).    Counseling lasted approximately 15 minutes and all her questions were answered.    FOLLOW-UP: with me for annual gyn exam or prn.

## 2022-08-18 ENCOUNTER — PATIENT MESSAGE (OUTPATIENT)
Dept: OBSTETRICS AND GYNECOLOGY | Facility: CLINIC | Age: 37
End: 2022-08-18
Payer: MEDICAID